# Patient Record
Sex: MALE | Race: WHITE | NOT HISPANIC OR LATINO | URBAN - METROPOLITAN AREA
[De-identification: names, ages, dates, MRNs, and addresses within clinical notes are randomized per-mention and may not be internally consistent; named-entity substitution may affect disease eponyms.]

---

## 2020-09-14 ENCOUNTER — LAB OUTSIDE AN ENCOUNTER (OUTPATIENT)
Dept: URBAN - METROPOLITAN AREA CLINIC 70 | Facility: CLINIC | Age: 85
End: 2020-09-14

## 2020-09-14 ENCOUNTER — DASHBOARD ENCOUNTERS (OUTPATIENT)
Age: 85
End: 2020-09-14

## 2020-09-14 ENCOUNTER — OFFICE VISIT (OUTPATIENT)
Dept: URBAN - METROPOLITAN AREA CLINIC 70 | Facility: CLINIC | Age: 85
End: 2020-09-14
Payer: MEDICARE

## 2020-09-14 DIAGNOSIS — R19.00 ABDOMINAL SWELLING: ICD-10-CM

## 2020-09-14 DIAGNOSIS — R10.12 LUQ ABDOMINAL PAIN: ICD-10-CM

## 2020-09-14 DIAGNOSIS — R11.2 NON-INTRACTABLE VOMITING WITH NAUSEA, UNSPECIFIED VOMITING TYPE: ICD-10-CM

## 2020-09-14 PROCEDURE — G8427 DOCREV CUR MEDS BY ELIG CLIN: HCPCS | Performed by: NURSE PRACTITIONER

## 2020-09-14 PROCEDURE — 99204 OFFICE O/P NEW MOD 45 MIN: CPT | Performed by: NURSE PRACTITIONER

## 2020-09-14 PROCEDURE — G8417 CALC BMI ABV UP PARAM F/U: HCPCS | Performed by: NURSE PRACTITIONER

## 2020-09-14 PROCEDURE — G9903 PT SCRN TBCO ID AS NON USER: HCPCS | Performed by: NURSE PRACTITIONER

## 2020-09-14 RX ORDER — LEVOTHYROXINE SODIUM 0.14 MG/1
TABLET ORAL
Qty: 7 UNSPECIFIED | Status: ACTIVE | COMMUNITY

## 2020-09-14 RX ORDER — MORPHINE SULFATE 20 MG/5ML
5 ML AS NEEDED SOLUTION ORAL
Status: ACTIVE | COMMUNITY

## 2020-09-14 RX ORDER — SERTRALINE HYDROCHLORIDE 50 MG/1
TABLET, FILM COATED ORAL
Qty: 7 UNSPECIFIED | Status: ACTIVE | COMMUNITY

## 2020-09-14 RX ORDER — FINASTERIDE 5 MG/1
TABLET, FILM COATED ORAL
Qty: 7 UNSPECIFIED | Status: ACTIVE | COMMUNITY

## 2020-09-14 RX ORDER — GEMFIBROZIL 600 MG/1
TABLET ORAL
Qty: 14 UNSPECIFIED | Status: ACTIVE | COMMUNITY

## 2020-09-14 RX ORDER — LORAZEPAM 1 MG/1
1 TABLET AT BEDTIME AS NEEDED TABLET ORAL ONCE A DAY
Status: ACTIVE | COMMUNITY

## 2020-09-14 RX ORDER — CARVEDILOL 3.12 MG/1
TABLET, FILM COATED ORAL
Qty: 14 UNSPECIFIED | Status: ACTIVE | COMMUNITY

## 2020-09-14 RX ORDER — TRAZODONE HYDROCHLORIDE 50 MG/1
TABLET ORAL
Qty: 7 UNSPECIFIED | Status: ACTIVE | COMMUNITY

## 2020-09-14 RX ORDER — ASPIRIN 81 MG/1
1 TABLET TABLET, COATED ORAL ONCE A DAY
Status: ACTIVE | COMMUNITY

## 2020-09-14 RX ORDER — MIRTAZAPINE 7.5 MG/1
TABLET, FILM COATED ORAL
Qty: 7 UNSPECIFIED | Status: ACTIVE | COMMUNITY

## 2020-09-14 RX ORDER — ATORVASTATIN CALCIUM 20 MG/1
TABLET, FILM COATED ORAL
Qty: 7 UNSPECIFIED | Status: ACTIVE | COMMUNITY

## 2020-09-14 RX ORDER — TRAMADOL HYDROCHLORIDE 50 MG/1
1 TABLET AS NEEDED TABLET, FILM COATED ORAL ONCE A DAY
Status: ACTIVE | COMMUNITY

## 2020-09-14 RX ORDER — BISACODYL 10 MG
1 SUPPOSITORY AS NEEDED SUPPOSITORY, RECTAL RECTAL ONCE A DAY
Status: ACTIVE | COMMUNITY

## 2020-09-14 RX ORDER — INSULIN LISPRO 100 [IU]/ML
AS DIRECTED INJECTION, SOLUTION INTRAVENOUS; SUBCUTANEOUS
Status: ACTIVE | COMMUNITY

## 2020-09-14 RX ORDER — FUROSEMIDE 40 MG/1
TABLET ORAL
Qty: 7 UNSPECIFIED | Status: ACTIVE | COMMUNITY

## 2020-09-14 RX ORDER — ACETAMINOPHEN 650 MG/1
1 SUPPOSITORY AS NEEDED SUPPOSITORY RECTAL
Status: ACTIVE | COMMUNITY

## 2020-09-14 NOTE — PHYSICAL EXAM CONSTITUTIONAL:
well developed, well nourished , in no acute distress , wheelchair dependent, normal communication ability

## 2020-09-14 NOTE — PHYSICAL EXAM GASTROINTESTINAL
Abdomen , soft, LUQ and LLQ tenderness,  distended , no guarding or rigidity , no masses palpable , hypoactive bowel sounds , Liver and Spleen: no hepatosplenomegaly

## 2020-09-14 NOTE — HPI-TODAY'S VISIT:
Patient presents today with his granddaughter with c/o increased abdominal swelling that started 3 days ago.  Patient has limited verbal communication so his granddaughter is providing information.  She states diarrhea did start with onset of abdominal swelling.  Stool were described as being loose/watery and green in color.  Patient voices onset of vomiting last night.  Abdominal pain is described as a soreness to left abdomen.  Denies flatulence or excessive belching.  Due to diarrhea, his nursing home facility did give hime a dose of Immodium AD.  Last BM is voiced as being a "small amount" this morning.  States that defecation usually occurs every 1-2 days.  Voices a colonoscopy as being years ago.

## 2020-09-16 ENCOUNTER — TELEPHONE ENCOUNTER (OUTPATIENT)
Dept: URBAN - METROPOLITAN AREA CLINIC 70 | Facility: CLINIC | Age: 85
End: 2020-09-16

## 2020-09-16 NOTE — HPI-OTHER HISTORIES
Radiologist, Dr. Brandon(?), called from [facility] with stat report.  CT AP from today (9/16/20) revealed free air and free fluid indicative of bowel perforation.  Suspects its coming from SB in setting of bowel obstruction.  Patient and granddaughter had already left imaging facility.  Granddaughter called with report findings immediately with orders to take to ER.  Due to patient's hospice status, is also consulting with patient's hospice nurse to determine best course of action.  Granddaughter to call back with plan.  Will also call ER with findings for  emergent status.

## 2020-10-02 ENCOUNTER — OFFICE VISIT (OUTPATIENT)
Dept: URBAN - METROPOLITAN AREA CLINIC 70 | Facility: CLINIC | Age: 85
End: 2020-10-02

## 2020-10-02 RX ORDER — MIRTAZAPINE 7.5 MG/1
TABLET, FILM COATED ORAL
Qty: 7 UNSPECIFIED | Status: ACTIVE | COMMUNITY

## 2020-10-02 RX ORDER — ATORVASTATIN CALCIUM 20 MG/1
TABLET, FILM COATED ORAL
Qty: 7 UNSPECIFIED | Status: ACTIVE | COMMUNITY

## 2020-10-02 RX ORDER — ACETAMINOPHEN 650 MG/1
1 SUPPOSITORY AS NEEDED SUPPOSITORY RECTAL
Status: ACTIVE | COMMUNITY

## 2020-10-02 RX ORDER — MORPHINE SULFATE 20 MG/5ML
5 ML AS NEEDED SOLUTION ORAL
Status: ACTIVE | COMMUNITY

## 2020-10-02 RX ORDER — INSULIN LISPRO 100 [IU]/ML
AS DIRECTED INJECTION, SOLUTION INTRAVENOUS; SUBCUTANEOUS
Status: ACTIVE | COMMUNITY

## 2020-10-02 RX ORDER — BISACODYL 10 MG
1 SUPPOSITORY AS NEEDED SUPPOSITORY, RECTAL RECTAL ONCE A DAY
Status: ACTIVE | COMMUNITY

## 2020-10-02 RX ORDER — TRAZODONE HYDROCHLORIDE 50 MG/1
TABLET ORAL
Qty: 7 UNSPECIFIED | Status: ACTIVE | COMMUNITY

## 2020-10-02 RX ORDER — FINASTERIDE 5 MG/1
TABLET, FILM COATED ORAL
Qty: 7 UNSPECIFIED | Status: ACTIVE | COMMUNITY

## 2020-10-02 RX ORDER — LEVOTHYROXINE SODIUM 0.14 MG/1
TABLET ORAL
Qty: 7 UNSPECIFIED | Status: ACTIVE | COMMUNITY

## 2020-10-02 RX ORDER — GEMFIBROZIL 600 MG/1
TABLET ORAL
Qty: 14 UNSPECIFIED | Status: ACTIVE | COMMUNITY

## 2020-10-02 RX ORDER — TRAMADOL HYDROCHLORIDE 50 MG/1
1 TABLET AS NEEDED TABLET, FILM COATED ORAL ONCE A DAY
Status: ACTIVE | COMMUNITY

## 2020-10-02 RX ORDER — ASPIRIN 81 MG/1
1 TABLET TABLET, COATED ORAL ONCE A DAY
Status: ACTIVE | COMMUNITY

## 2020-10-02 RX ORDER — FUROSEMIDE 40 MG/1
TABLET ORAL
Qty: 7 UNSPECIFIED | Status: ACTIVE | COMMUNITY

## 2020-10-02 RX ORDER — LORAZEPAM 1 MG/1
1 TABLET AT BEDTIME AS NEEDED TABLET ORAL ONCE A DAY
Status: ACTIVE | COMMUNITY

## 2020-10-02 RX ORDER — CARVEDILOL 3.12 MG/1
TABLET, FILM COATED ORAL
Qty: 14 UNSPECIFIED | Status: ACTIVE | COMMUNITY

## 2020-10-02 RX ORDER — SERTRALINE HYDROCHLORIDE 50 MG/1
TABLET, FILM COATED ORAL
Qty: 7 UNSPECIFIED | Status: ACTIVE | COMMUNITY